# Patient Record
Sex: MALE | Race: BLACK OR AFRICAN AMERICAN | NOT HISPANIC OR LATINO
[De-identification: names, ages, dates, MRNs, and addresses within clinical notes are randomized per-mention and may not be internally consistent; named-entity substitution may affect disease eponyms.]

---

## 2017-01-06 ENCOUNTER — APPOINTMENT (OUTPATIENT)
Dept: HEART AND VASCULAR | Facility: CLINIC | Age: 57
End: 2017-01-06

## 2017-01-06 ENCOUNTER — APPOINTMENT (OUTPATIENT)
Dept: UROLOGY | Facility: CLINIC | Age: 57
End: 2017-01-06

## 2017-01-06 VITALS
HEIGHT: 72 IN | OXYGEN SATURATION: 95 % | SYSTOLIC BLOOD PRESSURE: 126 MMHG | HEART RATE: 95 BPM | BODY MASS INDEX: 30.48 KG/M2 | WEIGHT: 225 LBS | DIASTOLIC BLOOD PRESSURE: 80 MMHG | RESPIRATION RATE: 12 BRPM

## 2017-01-06 VITALS
TEMPERATURE: 98.4 F | OXYGEN SATURATION: 95 % | DIASTOLIC BLOOD PRESSURE: 77 MMHG | BODY MASS INDEX: 30.48 KG/M2 | HEART RATE: 93 BPM | SYSTOLIC BLOOD PRESSURE: 148 MMHG | HEIGHT: 72 IN | WEIGHT: 225 LBS

## 2017-01-06 DIAGNOSIS — Z86.79 PERSONAL HISTORY OF OTHER DISEASES OF THE CIRCULATORY SYSTEM: ICD-10-CM

## 2017-01-06 DIAGNOSIS — Z86.39 PERSONAL HISTORY OF OTHER ENDOCRINE, NUTRITIONAL AND METABOLIC DISEASE: ICD-10-CM

## 2017-01-06 RX ORDER — CLOTRIMAZOLE AND BETAMETHASONE DIPROPIONATE 10; .5 MG/G; MG/G
1-0.05 CREAM TOPICAL TWICE DAILY
Qty: 30 | Refills: 0 | Status: ACTIVE | COMMUNITY
Start: 2017-01-06 | End: 1900-01-01

## 2017-01-24 ENCOUNTER — OTHER (OUTPATIENT)
Age: 57
End: 2017-01-24

## 2017-01-24 ENCOUNTER — APPOINTMENT (OUTPATIENT)
Dept: ENDOCRINOLOGY | Facility: CLINIC | Age: 57
End: 2017-01-24

## 2017-01-24 VITALS
DIASTOLIC BLOOD PRESSURE: 84 MMHG | SYSTOLIC BLOOD PRESSURE: 114 MMHG | WEIGHT: 225 LBS | BODY MASS INDEX: 30.52 KG/M2 | HEART RATE: 69 BPM

## 2017-01-24 DIAGNOSIS — E11.65 TYPE 2 DIABETES MELLITUS WITH HYPERGLYCEMIA: ICD-10-CM

## 2017-01-24 RX ORDER — BLOOD SUGAR DIAGNOSTIC
STRIP MISCELLANEOUS TWICE DAILY
Qty: 60 | Refills: 3 | Status: ACTIVE | COMMUNITY
Start: 2017-01-24 | End: 1900-01-01

## 2017-01-24 RX ORDER — LANCETS 33 GAUGE
EACH MISCELLANEOUS
Qty: 50 | Refills: 3 | Status: ACTIVE | COMMUNITY
Start: 2017-01-24 | End: 1900-01-01

## 2017-02-07 ENCOUNTER — APPOINTMENT (OUTPATIENT)
Dept: UROLOGY | Facility: CLINIC | Age: 57
End: 2017-02-07

## 2017-02-07 VITALS
SYSTOLIC BLOOD PRESSURE: 128 MMHG | TEMPERATURE: 98.5 F | BODY MASS INDEX: 30.48 KG/M2 | HEIGHT: 72 IN | WEIGHT: 225 LBS | HEART RATE: 81 BPM | DIASTOLIC BLOOD PRESSURE: 76 MMHG

## 2017-02-21 ENCOUNTER — APPOINTMENT (OUTPATIENT)
Dept: UROLOGY | Facility: CLINIC | Age: 57
End: 2017-02-21

## 2017-02-28 ENCOUNTER — APPOINTMENT (OUTPATIENT)
Dept: UROLOGY | Facility: CLINIC | Age: 57
End: 2017-02-28

## 2017-03-02 ENCOUNTER — APPOINTMENT (OUTPATIENT)
Dept: ENDOCRINOLOGY | Facility: CLINIC | Age: 57
End: 2017-03-02

## 2017-03-06 ENCOUNTER — FORM ENCOUNTER (OUTPATIENT)
Age: 57
End: 2017-03-06

## 2017-03-07 ENCOUNTER — OUTPATIENT (OUTPATIENT)
Dept: OUTPATIENT SERVICES | Facility: HOSPITAL | Age: 57
LOS: 1 days | End: 2017-03-07
Payer: MEDICARE

## 2017-03-07 ENCOUNTER — APPOINTMENT (OUTPATIENT)
Dept: UROLOGY | Facility: CLINIC | Age: 57
End: 2017-03-07

## 2017-03-07 VITALS
TEMPERATURE: 99.1 F | HEART RATE: 86 BPM | BODY MASS INDEX: 30.48 KG/M2 | DIASTOLIC BLOOD PRESSURE: 67 MMHG | HEIGHT: 72 IN | SYSTOLIC BLOOD PRESSURE: 107 MMHG | WEIGHT: 225 LBS

## 2017-03-07 DIAGNOSIS — N52.9 MALE ERECTILE DYSFUNCTION, UNSPECIFIED: ICD-10-CM

## 2017-03-07 PROCEDURE — 71046 X-RAY EXAM CHEST 2 VIEWS: CPT

## 2017-03-07 PROCEDURE — 71020: CPT | Mod: 26

## 2017-03-07 RX ORDER — SULFAMETHOXAZOLE AND TRIMETHOPRIM 800; 160 MG/1; MG/1
800-160 TABLET ORAL TWICE DAILY
Qty: 18 | Refills: 0 | Status: ACTIVE | COMMUNITY
Start: 2017-03-07 | End: 1900-01-01

## 2017-03-07 RX ORDER — CHLORHEXIDINE GLUCONATE 213 G/1000ML
4 SOLUTION TOPICAL
Qty: 1000 | Refills: 0 | Status: ACTIVE | COMMUNITY
Start: 2017-03-07 | End: 1900-01-01

## 2017-03-08 ENCOUNTER — RX RENEWAL (OUTPATIENT)
Age: 57
End: 2017-03-08

## 2017-03-08 LAB
ANION GAP SERPL CALC-SCNC: 16 MMOL/L
APPEARANCE: CLEAR
APTT BLD: 29.8 SEC
BACTERIA: NEGATIVE
BASOPHILS # BLD AUTO: 0.04 K/UL
BASOPHILS NFR BLD AUTO: 0.5 %
BILIRUBIN URINE: NEGATIVE
BLOOD URINE: NEGATIVE
BUN SERPL-MCNC: 10 MG/DL
CALCIUM SERPL-MCNC: 9.7 MG/DL
CHLORIDE SERPL-SCNC: 100 MMOL/L
CO2 SERPL-SCNC: 23 MMOL/L
COLOR: YELLOW
CREAT SERPL-MCNC: 0.92 MG/DL
EOSINOPHIL # BLD AUTO: 0.09 K/UL
EOSINOPHIL NFR BLD AUTO: 1.2 %
GLUCOSE QUALITATIVE U: 1000 MG/DL
GLUCOSE SERPL-MCNC: 189 MG/DL
HBA1C MFR BLD HPLC: 7.9 %
HCT VFR BLD CALC: 44.1 %
HGB BLD-MCNC: 15 G/DL
IMM GRANULOCYTES NFR BLD AUTO: 0.3 %
INR PPP: 1.01 RATIO
KETONES URINE: NEGATIVE
LEUKOCYTE ESTERASE URINE: NEGATIVE
LYMPHOCYTES # BLD AUTO: 2.03 K/UL
LYMPHOCYTES NFR BLD AUTO: 27.1 %
MAN DIFF?: NORMAL
MCHC RBC-ENTMCNC: 30.2 PG
MCHC RBC-ENTMCNC: 34 GM/DL
MCV RBC AUTO: 88.7 FL
MICROSCOPIC-UA: NORMAL
MONOCYTES # BLD AUTO: 0.56 K/UL
MONOCYTES NFR BLD AUTO: 7.5 %
NEUTROPHILS # BLD AUTO: 4.74 K/UL
NEUTROPHILS NFR BLD AUTO: 63.4 %
NITRITE URINE: NEGATIVE
PH URINE: 5.5
PLATELET # BLD AUTO: 313 K/UL
POTASSIUM SERPL-SCNC: 4.5 MMOL/L
PROTEIN URINE: NEGATIVE MG/DL
PT BLD: 11.4 SEC
RBC # BLD: 4.97 M/UL
RBC # FLD: 12.7 %
RED BLOOD CELLS URINE: 4 /HPF
SODIUM SERPL-SCNC: 139 MMOL/L
SPECIFIC GRAVITY URINE: 1.04
SQUAMOUS EPITHELIAL CELLS: 0 /HPF
UROBILINOGEN URINE: NORMAL MG/DL
WBC # FLD AUTO: 7.48 K/UL
WHITE BLOOD CELLS URINE: 1 /HPF

## 2017-03-08 RX ORDER — TRAZODONE HYDROCHLORIDE 50 MG/1
50 TABLET ORAL
Qty: 30 | Refills: 5 | Status: ACTIVE | COMMUNITY
Start: 2017-03-08 | End: 1900-01-01

## 2017-03-09 ENCOUNTER — APPOINTMENT (OUTPATIENT)
Dept: ENDOCRINOLOGY | Facility: CLINIC | Age: 57
End: 2017-03-09

## 2017-03-09 LAB — BACTERIA UR CULT: NORMAL

## 2017-03-16 ENCOUNTER — APPOINTMENT (OUTPATIENT)
Dept: HEART AND VASCULAR | Facility: CLINIC | Age: 57
End: 2017-03-16

## 2017-03-21 RX ORDER — SILDENAFIL CITRATE 100 MG/1
100 TABLET, FILM COATED ORAL
Qty: 6 | Refills: 11 | Status: ACTIVE | COMMUNITY
Start: 2017-01-06 | End: 1900-01-01

## 2017-04-03 ENCOUNTER — APPOINTMENT (OUTPATIENT)
Dept: HEART AND VASCULAR | Facility: CLINIC | Age: 57
End: 2017-04-03

## 2017-04-10 ENCOUNTER — APPOINTMENT (OUTPATIENT)
Dept: ENDOCRINOLOGY | Facility: CLINIC | Age: 57
End: 2017-04-10

## 2017-04-24 ENCOUNTER — RX RENEWAL (OUTPATIENT)
Age: 57
End: 2017-04-24

## 2017-05-12 ENCOUNTER — RX RENEWAL (OUTPATIENT)
Age: 57
End: 2017-05-12

## 2017-05-18 ENCOUNTER — RX RENEWAL (OUTPATIENT)
Age: 57
End: 2017-05-18

## 2017-05-22 ENCOUNTER — RX RENEWAL (OUTPATIENT)
Age: 57
End: 2017-05-22

## 2017-08-11 ENCOUNTER — RX RENEWAL (OUTPATIENT)
Age: 57
End: 2017-08-11

## 2017-08-23 ENCOUNTER — RX RENEWAL (OUTPATIENT)
Age: 57
End: 2017-08-23

## 2017-09-06 ENCOUNTER — RX RENEWAL (OUTPATIENT)
Age: 57
End: 2017-09-06

## 2017-10-03 ENCOUNTER — APPOINTMENT (OUTPATIENT)
Dept: ENDOCRINOLOGY | Facility: CLINIC | Age: 57
End: 2017-10-03

## 2017-10-17 ENCOUNTER — RX RENEWAL (OUTPATIENT)
Age: 57
End: 2017-10-17

## 2017-10-25 ENCOUNTER — RX RENEWAL (OUTPATIENT)
Age: 57
End: 2017-10-25

## 2017-10-30 ENCOUNTER — RX RENEWAL (OUTPATIENT)
Age: 57
End: 2017-10-30

## 2017-10-30 RX ORDER — ALBUTEROL SULFATE 90 UG/1
108 (90 BASE) AEROSOL, METERED RESPIRATORY (INHALATION) EVERY 4 HOURS
Qty: 8.5 | Refills: 5 | Status: ACTIVE | COMMUNITY
Start: 2017-05-22 | End: 1900-01-01

## 2017-11-13 RX ORDER — TRIAMCINOLONE ACETONIDE 1 MG/G
0.1 CREAM TOPICAL TWICE DAILY
Qty: 30 | Refills: 0 | Status: ACTIVE | COMMUNITY
Start: 2017-02-07 | End: 1900-01-01

## 2017-11-27 ENCOUNTER — RX RENEWAL (OUTPATIENT)
Age: 57
End: 2017-11-27

## 2017-12-21 ENCOUNTER — RX RENEWAL (OUTPATIENT)
Age: 57
End: 2017-12-21

## 2017-12-21 RX ORDER — CANAGLIFLOZIN 100 MG/1
100 TABLET, FILM COATED ORAL
Qty: 30 | Refills: 0 | Status: ACTIVE | COMMUNITY
Start: 2017-01-24 | End: 1900-01-01

## 2018-01-02 ENCOUNTER — APPOINTMENT (OUTPATIENT)
Dept: HEART AND VASCULAR | Facility: CLINIC | Age: 58
End: 2018-01-02

## 2018-01-16 ENCOUNTER — APPOINTMENT (OUTPATIENT)
Dept: UROLOGY | Facility: CLINIC | Age: 58
End: 2018-01-16
Payer: MEDICARE

## 2018-01-16 VITALS
SYSTOLIC BLOOD PRESSURE: 137 MMHG | HEART RATE: 86 BPM | HEIGHT: 72 IN | BODY MASS INDEX: 29.8 KG/M2 | DIASTOLIC BLOOD PRESSURE: 83 MMHG | TEMPERATURE: 98.4 F | OXYGEN SATURATION: 98 % | WEIGHT: 220 LBS

## 2018-01-16 PROCEDURE — 99213 OFFICE O/P EST LOW 20 MIN: CPT

## 2018-01-16 RX ORDER — CLOTRIMAZOLE AND BETAMETHASONE DIPROPIONATE 10; .5 MG/G; MG/G
1-0.05 CREAM TOPICAL 3 TIMES DAILY
Qty: 30 | Refills: 1 | Status: ACTIVE | COMMUNITY
Start: 2018-01-16 | End: 1900-01-01

## 2018-01-22 ENCOUNTER — RX RENEWAL (OUTPATIENT)
Age: 58
End: 2018-01-22

## 2018-01-22 LAB — PSA SERPL-MCNC: 0.64 NG/ML

## 2018-02-20 ENCOUNTER — RX RENEWAL (OUTPATIENT)
Age: 58
End: 2018-02-20

## 2018-03-09 ENCOUNTER — RX RENEWAL (OUTPATIENT)
Age: 58
End: 2018-03-09

## 2018-04-27 ENCOUNTER — RX RENEWAL (OUTPATIENT)
Age: 58
End: 2018-04-27

## 2018-10-18 ENCOUNTER — RX RENEWAL (OUTPATIENT)
Age: 58
End: 2018-10-18

## 2018-12-27 ENCOUNTER — APPOINTMENT (OUTPATIENT)
Dept: UROLOGY | Facility: CLINIC | Age: 58
End: 2018-12-27

## 2019-01-03 ENCOUNTER — APPOINTMENT (OUTPATIENT)
Dept: UROLOGY | Facility: CLINIC | Age: 59
End: 2019-01-03
Payer: MEDICARE

## 2019-01-03 VITALS
TEMPERATURE: 98.9 F | SYSTOLIC BLOOD PRESSURE: 135 MMHG | OXYGEN SATURATION: 99 % | DIASTOLIC BLOOD PRESSURE: 88 MMHG | BODY MASS INDEX: 29.8 KG/M2 | WEIGHT: 220 LBS | HEIGHT: 72 IN | HEART RATE: 93 BPM

## 2019-01-03 DIAGNOSIS — N47.1 PHIMOSIS: ICD-10-CM

## 2019-01-03 PROCEDURE — 99213 OFFICE O/P EST LOW 20 MIN: CPT

## 2019-01-03 RX ORDER — CLOTRIMAZOLE AND BETAMETHASONE DIPROPIONATE 10; .5 MG/G; MG/G
1-0.05 CREAM TOPICAL TWICE DAILY
Qty: 1 | Refills: 0 | Status: ACTIVE | COMMUNITY
Start: 2019-01-03 | End: 1900-01-01

## 2019-01-09 LAB
APPEARANCE: CLEAR
BACTERIA UR CULT: NORMAL
BACTERIA: NEGATIVE
BILIRUBIN URINE: NEGATIVE
BLOOD URINE: NEGATIVE
COLOR: YELLOW
GLUCOSE QUALITATIVE U: 1000 MG/DL
HYALINE CASTS: 0 /LPF
KETONES URINE: NEGATIVE
LEUKOCYTE ESTERASE URINE: NEGATIVE
MICROSCOPIC-UA: NORMAL
NITRITE URINE: NEGATIVE
PH URINE: 5.5
PROTEIN URINE: NEGATIVE MG/DL
PSA SERPL-MCNC: 0.36 NG/ML
RED BLOOD CELLS URINE: 1 /HPF
SPECIFIC GRAVITY URINE: 1.04
SQUAMOUS EPITHELIAL CELLS: 0 /HPF
UROBILINOGEN URINE: NEGATIVE MG/DL
WHITE BLOOD CELLS URINE: 0 /HPF

## 2019-07-13 ENCOUNTER — HOSPITAL ENCOUNTER (INPATIENT)
Dept: HOSPITAL 74 - YASAS | Age: 59
LOS: 2 days | Discharge: LEFT BEFORE DISCHARGE | DRG: 894 | End: 2019-07-15
Attending: SURGERY | Admitting: SURGERY
Payer: COMMERCIAL

## 2019-07-13 DIAGNOSIS — F17.213: ICD-10-CM

## 2019-07-13 DIAGNOSIS — K21.9: ICD-10-CM

## 2019-07-13 DIAGNOSIS — E11.9: ICD-10-CM

## 2019-07-13 DIAGNOSIS — J45.40: ICD-10-CM

## 2019-07-13 DIAGNOSIS — Z79.84: ICD-10-CM

## 2019-07-13 DIAGNOSIS — F11.23: ICD-10-CM

## 2019-07-13 DIAGNOSIS — F32.9: ICD-10-CM

## 2019-07-13 DIAGNOSIS — F10.230: Primary | ICD-10-CM

## 2019-07-13 DIAGNOSIS — I10: ICD-10-CM

## 2019-07-13 PROCEDURE — HZ2ZZZZ DETOXIFICATION SERVICES FOR SUBSTANCE ABUSE TREATMENT: ICD-10-PCS | Performed by: SURGERY

## 2019-07-13 RX ADMIN — NICOTINE SCH: 7 PATCH TRANSDERMAL at 18:55

## 2019-07-13 RX ADMIN — METHOCARBAMOL PRN MG: 500 TABLET ORAL at 18:55

## 2019-07-13 RX ADMIN — Medication SCH MG: at 22:42

## 2019-07-13 RX ADMIN — Medication SCH TABLET: at 22:43

## 2019-07-13 NOTE — HP
COWS





- Scale


Resting Pulse: 0= [place] 80 or Below


Sweatin=Flushed/Facial Moisture


Restless Observation: 1= Difficult to Sit Still


Pupil Size: 1= Pupils >than Normal


Bone or Joint Aches: 2= Severe Diffuse Aches


Runny Nose/ Eye Tearin= Runny Nose/Eyes


GI Upset > 30mins: 2= Nausea/Diarrhea


Tremor Observation: 2= Slight Tremor Visible


Yawning Observation: 1= 1-2x During Session


Anxiety or Irritability: 2=Irritable/Anxious


Goose Flesh Skin: 3=Piloerection


COWS Score: 18





CIWA Score


Nausea/Vomitin


Muscle Tremors: 3


Anxiety: 3


Agitation: 2


Paroxysmal Sweats: 2


Orientation: 0-Oriented


Tacttile Disturbances: 2-Mild Itch/Numbness/Burn


Auditory Disturbances: 1-Very Mild


Visual Disturbances: 1-Very Mild Sensitivity


Headache: 2-Mild


CIWA-Ar Total Score: 18





- Admission Criteria


[facility] Guidelines: Admission for Medically Managed Detox: 


Requires at least one of the followin. CIWA greater than 12


2. Seizures within the past 24 hours


3. Delirium tremens within the past 24 hours


4. Hallucinations within the past 24 hours


5. Acute intervention needed for co  occurring medical disorder


6. Acute intervention needed for co  occurring psychiatric disorder


7. Severe withdrawal that cannot be handled at a lower level of care (continued


    vomiting, continued diarrhea, abnormal vital signs) requiring intravenous


    medication and/or fluids


8. Pregnancy





Patient presents the following: CIWA greater than 12


Admission Criteria Met: Admission criteria met





Admission ROS [facility]





- [facility]


Chief Complaint: 





I need detox, I'm sick


Allergies/Adverse Reactions: 


 Allergies











Allergy/AdvReac Type Severity Reaction Status Date / Time


 


No Known Allergies Allergy   Verified 19 15:05











History of Present Illness: 





58 year old man presents for detox from opiates and alcohol. Denies recent 

treatment, denies blackouts or seizures related to alcohol.





- Ebola screening


Have you traveled outside of the country in the last 21 days: No (N)


Have you had contact with anyone from an Ebola affected area: No


Have you been sick,other than usual withdrawal symptoms: No


Do you have a fever: No





- Review of Systems


Constitutional: Chills, Loss of Appetite, Changes in sleep


EENT: reports: Recent change in vision, Nose Congestion


Respiratory: reports: Cough, Shortness of Breath


Cardiac: reports: No Symptoms Reported


GI: reports: Poor Fluid Intake, Abdominal cramping


: reports: No Symptoms Reported


Musculoskeletal: reports: Back Pain, Joint Pain, Muscle Pain, Muscle Weakness


Integumentary: reports: Flushing


Neuro: reports: Headache, Numbness, Tremors


Endocrine: reports: No Symptoms Reported


Hematology: reports: No Symptoms Reported


Psychiatric: reports: Anxious, Depressed


Other Systems: Reviewed and Negative





Patient History





- Patient Medical History


Hx Anemia: No


Hx Asthma: Yes


Hx Chronic Obstructive Pulmonary Disease (COPD): No


Hx Cancer: No


Hx Cardiac Disorders: No


Hx Congestive Heart Failure: No


Hx Hypertension: No


Hx Hypercholesterolemia: No


Hx Pacemaker: No


HX Cerebrovascular Accident: No


Hx Seizures: No


Hx Dementia: No


Hx Diabetes: Yes


Hx Gastrointestinal Disorders: No


Hx Liver Disease: No


Hx Genitourinary Disorders: No


Hx Sexually Transmitted Disorders: No


Hx Renal Disease (ESRD): No


Hx Thyroid Disease: No


Hx Human Immunodeficiency Virus (HIV): No


Hx Hepatitis C: No


Hx Depression: Yes


Hx Suicide Attempt: No


Hx Bipolar Disorder: No


Hx Schizophrenia: No





- Patient Surgical History


Past Surgical History: No





- PPD History


Previous Implant?: Yes


Documented Results: Positive w/o proof


Implanted On Prior [facility] Admission?: No


PPD to be Administered?: No





- Smoking Cessation


Smoking history: Current every day smoker


Have you smoked in the past 12 months: Yes


Aproximately how many cigarettes per day: 10


Hx Chewing Tobacco Use: No


Initiated information on smoking cessation: Yes


'Breaking Loose' booklet given: 19





- Substance & Tx. History


Hx Alcohol Use: Yes


Hx Substance Use: No


Substance Use Type: Alcohol, Heroin, Tranquilizers


Hx Substance Use Treatment: No





- Substances abused


  ** Alcohol


Substance route: Oral


Frequency: Daily


Amount used: 1 40oz beer/day


Age of first use: 58


Date of last use: 07/10/19





  ** Heroin


Substance route: Inhalation


Frequency: Daily


Amount used: 3-4 bags/day


Age of first use: 48


Date of last use: 19





Family Disease History





- Family Disease History


Family History: Unremarkable





Admission Physical Exam [facility]





- Vital Signs


Vital Signs: 


 Vital Signs - 24 hr











  19





  15:02


 


Temperature 97.9 F


 


Pulse Rate 83


 


Respiratory 18





Rate 


 


Blood Pressure 148/95














- Physical


General Appearance: Yes: Mild Distress


HEENTM: Yes: EOMI, Hearing grossly Normal, Normal ENT Inspection, Normocephalic

, Normal Voice, JOSUE, Pharynx Normal


Respiratory: Yes: Chest Non-Tender, No Respiratory Distress, No Accessory 

Muscle Use, Wheezing


Neck: Yes: No masses,lesions,Nodules, Supple


Breast: Yes: Breast Exam Deferred


Cardiology: Yes: Regular Rhythm, Regular Rate


Abdominal: Yes: Normal Bowel Sounds, Soft


Genitourinary: Yes: Within Normal Limits


Back: Yes: Normal Inspection


Musculoskeletal: Yes: full range of Motion, Back pain, Muscle Pain


Extremities: Yes: Tremors, Coldness


Neurological: Yes: CNs II-XII NML intact, Fully Oriented, Alert, Normal Mood/

Affect, Normal Response


Integumentary: Yes: Cold, Clammy


Lymphatic: Yes: Within Normal Limits





- Diagnostic


(1) Alcohol dependence with uncomplicated withdrawal


Current Visit: Yes   Status: Acute   





(2) Opiate addiction


Current Visit: Yes   Status: Acute   


Qualifiers: 


   Substance use status: uncomplicated   Qualified Code(s): F11.20 - Opioid 

dependence, uncomplicated   





(3) Nicotine addiction


Current Visit: Yes   Status: Acute   


Qualifiers: 


   Nicotine product type: cigarettes   Substance use status: uncomplicated   

Qualified Code(s): F17.210 - Nicotine dependence, cigarettes, uncomplicated   





(4) Asthma


Current Visit: Yes   Status: Acute   


Qualifiers: 


   Asthma severity: moderate   Asthma persistence: persistent 





Cleared for Admission S





- Detox or Rehab


[facility] Level of Care: Medically Managed


Detox Regimen/Protocol: Methadone/Librium


Claeared for Rehab Admission: No





Breathalyzer





- Breathalyzer


Breathalyzer: 0





Urine Drug Screen





- Test Device


Lot number: [id number]


Expiration date: 21





- Control


Is test valid?: Yes





- Results


Drug screen NEGATIVE: No


Urine drug screen results: THC-Marijuana, FEN-Fentanyl, MOP-Opiates, OXY-

Oxycodone





Inpatient Rehab Admission





- Rehab Decision to Admit


Inpatient rehab admission?: No

## 2019-07-14 LAB
ALBUMIN SERPL-MCNC: 3.1 G/DL (ref 3.4–5)
ALP SERPL-CCNC: 69 U/L (ref 45–117)
ALT SERPL-CCNC: 29 U/L (ref 13–61)
ANION GAP SERPL CALC-SCNC: 6 MMOL/L (ref 8–16)
AST SERPL-CCNC: 8 U/L (ref 15–37)
BILIRUB SERPL-MCNC: 0.5 MG/DL (ref 0.2–1)
BUN SERPL-MCNC: 8.9 MG/DL (ref 7–18)
CALCIUM SERPL-MCNC: 8.5 MG/DL (ref 8.5–10.1)
CHLORIDE SERPL-SCNC: 107 MMOL/L (ref 98–107)
CO2 SERPL-SCNC: 28 MMOL/L (ref 21–32)
CREAT SERPL-MCNC: 0.8 MG/DL (ref 0.55–1.3)
DEPRECATED RDW RBC AUTO: 12.9 % (ref 11.9–15.9)
GLUCOSE SERPL-MCNC: 192 MG/DL (ref 74–106)
HCT VFR BLD CALC: 38.1 % (ref 35.4–49)
HGB BLD-MCNC: 13.1 GM/DL (ref 11.7–16.9)
MCH RBC QN AUTO: 29.5 PG (ref 25.7–33.7)
MCHC RBC AUTO-ENTMCNC: 34.5 G/DL (ref 32–35.9)
MCV RBC: 85.7 FL (ref 80–96)
PLATELET # BLD AUTO: 261 K/MM3 (ref 134–434)
PMV BLD: 8.5 FL (ref 7.5–11.1)
POTASSIUM SERPLBLD-SCNC: 3.6 MMOL/L (ref 3.5–5.1)
PROT SERPL-MCNC: 5.9 G/DL (ref 6.4–8.2)
RBC # BLD AUTO: 4.44 M/MM3 (ref 4–5.6)
SODIUM SERPL-SCNC: 141 MMOL/L (ref 136–145)
WBC # BLD AUTO: 7.2 K/MM3 (ref 4–10)

## 2019-07-14 RX ADMIN — NICOTINE SCH: 7 PATCH TRANSDERMAL at 10:25

## 2019-07-14 RX ADMIN — METHOCARBAMOL PRN MG: 500 TABLET ORAL at 22:26

## 2019-07-14 RX ADMIN — Medication SCH: at 22:29

## 2019-07-14 RX ADMIN — Medication SCH MG: at 22:24

## 2019-07-14 RX ADMIN — Medication SCH TABLET: at 07:34

## 2019-07-14 RX ADMIN — LOSARTAN POTASSIUM SCH MG: 25 TABLET, FILM COATED ORAL at 23:28

## 2019-07-14 RX ADMIN — Medication SCH TAB: at 10:25

## 2019-07-14 NOTE — PN
S CIWA





- CIWA Score


Nausea/Vomitin-Mild Nausea/No Vomiting


Muscle Tremors: 3


Anxiety: 3


Agitation: 3


Paroxysmal Sweats: 1-Minimal Palms Moist


Orientation: 1-Uncertain about Date


Tacttile Disturbances: 1-Very Mild Itch/Numbness


Auditory Disturbances: 0-None


Visual Disturbances: 0-None


Headache: 0-None Present


CIWA-Ar Total Score: 13





[facility] COWS





- Scale


Resting Pulse: 0= [place] 80 or Below


Sweatin= Chills/Flushing


Restless Observation: 0= Sits Still


Pupil Size: 0= Normal to Room Light


Bone or Joint Aches: 2= Severe Diffuse Aches


Runny Nose/ Eye Tearin= Nasal Congestion


GI Upset > 30mins: 2= Nausea/Diarrhea


Tremor Observation of Outstretched Hands: 2= Slight Tremor Visible


Yawning Observation: 2= >3x During Session


Anxiety or Irritability: 2=Irritable/Anxious


Goose Flesh Skin: 0=Smooth Skin


COWS Score: 12





[facility] Progress Note (SOAP)


Subjective: 





58 years old male admitted on 19 for alcohol and opiate withdrawal sx


medical history of asthma diabete II hypertension 


patient received monthly endocet of 150 pill x 30 days last filled 19





external mediation reviewed that the patient is taking losartan 25 mg po daily 

for hypertension


begin losartan 25 mg po daily and clondine 0.1 mg po q6h prn for hypertension


Objective: 





19 12:09


 Vital Signs











Temperature  97.8 F   19 09:15


 


Pulse Rate  62   19 09:15


 


Respiratory Rate  18   19 09:15


 


Blood Pressure  147/82   19 09:15


 


O2 Sat by Pulse Oximetry (%)      








 Laboratory Last Values











WBC  7.2 K/mm3 (4.0-10.0)   19  07:50    


 


RBC  4.44 M/mm3 (4.00-5.60)   19  07:50    


 


Hgb  13.1 GM/dL (11.7-16.9)   19  07:50    


 


Hct  38.1 % (35.4-49)   19  07:50    


 


MCV  85.7 fl (80-96)   19  07:50    


 


MCH  29.5 pg (25.7-33.7)   19  07:50    


 


MCHC  34.5 g/dl (32.0-35.9)   19  07:50    


 


RDW  12.9 % (11.9-15.9)   19  07:50    


 


Plt Count  261 K/MM3 (134-434)   19  07:50    


 


MPV  8.5 fl (7.5-11.1)   19  07:50    


 


Sodium  141 mmol/L (136-145)   19  07:50    


 


Potassium  3.6 mmol/L (3.5-5.1)   19  07:50    


 


Chloride  107 mmol/L ()   19  07:50    


 


Carbon Dioxide  28 mmol/L (21-32)   19  07:50    


 


Anion Gap  6 MMOL/L (8-16)  L  19  07:50    


 


BUN  8.9 mg/dL (7-18)   19  07:50    


 


Creatinine  0.8 mg/dL (0.55-1.3)   19  07:50    


 


Est GFR (CKD-EPI)AfAm  114.93   19  07:50    


 


Est GFR (CKD-EPI)NonAf  99.16   19  07:50    


 


Random Glucose  192 mg/dL ()  H  19  07:50    


 


Calcium  8.5 mg/dL (8.5-10.1)   19  07:50    


 


Total Bilirubin  0.5 mg/dL (0.2-1)   19  07:50    


 


AST  8 U/L (15-37)  L  19  07:50    


 


ALT  29 U/L (13-61)   19  07:50    


 


Alkaline Phosphatase  69 U/L ()   19  07:50    


 


Total Protein  5.9 g/dl (6.4-8.2)  L  19  07:50    


 


Albumin  3.1 g/dl (3.4-5.0)  L  19  07:50    


 


RPR Titer  Nonreactive  (NONREACTIVE)   19  07:50    








lab noted


Assessment: 





19 12:10


alcohol and opiate withdrawal sx


Plan: 





continue alcohol and opiate detox

## 2019-07-15 VITALS — SYSTOLIC BLOOD PRESSURE: 124 MMHG | DIASTOLIC BLOOD PRESSURE: 73 MMHG | TEMPERATURE: 96.7 F | HEART RATE: 75 BPM

## 2019-07-15 RX ADMIN — NICOTINE SCH: 7 PATCH TRANSDERMAL at 10:11

## 2019-07-15 RX ADMIN — LOSARTAN POTASSIUM SCH MG: 25 TABLET, FILM COATED ORAL at 10:10

## 2019-07-15 RX ADMIN — Medication SCH TAB: at 10:11

## 2019-07-15 RX ADMIN — Medication SCH TABLET: at 08:04

## 2019-07-15 NOTE — DS
[facility] Detox Discharge Summary


Admission Date: 


07/13/19





Discharge Date: 07/15/19





- History


Present History: Alcohol Dependence, Opioid Dependence


Additional Comments: 





58 years old male admitted on 07/13/19 for alcohol and opiate withdrawal sx


medical history of gerd and asthma treated with zantac bid 150 mg





alert no acute distress denies suicidal ideation 


patient insists to leave the detox unit without apparent reason 








- Physical Exam Results


Vital Signs: 


 Vital Signs











Temperature  96.7 F L  07/15/19 09:15


 


Pulse Rate  75   07/15/19 09:15


 


Respiratory Rate  20   07/15/19 09:15


 


Blood Pressure  124/73   07/15/19 09:15


 


O2 Sat by Pulse Oximetry (%)      











Pertinent Admission Physical Exam Findings: 





alcohol and opiate withdrawal sx


 Laboratory Last Values











WBC  7.2 K/mm3 (4.0-10.0)   07/14/19  07:50    


 


RBC  4.44 M/mm3 (4.00-5.60)   07/14/19  07:50    


 


Hgb  13.1 GM/dL (11.7-16.9)   07/14/19  07:50    


 


Hct  38.1 % (35.4-49)   07/14/19  07:50    


 


MCV  85.7 fl (80-96)   07/14/19  07:50    


 


MCH  29.5 pg (25.7-33.7)   07/14/19  07:50    


 


MCHC  34.5 g/dl (32.0-35.9)   07/14/19  07:50    


 


RDW  12.9 % (11.9-15.9)   07/14/19  07:50    


 


Plt Count  261 K/MM3 (134-434)   07/14/19  07:50    


 


MPV  8.5 fl (7.5-11.1)   07/14/19  07:50    


 


Sodium  141 mmol/L (136-145)   07/14/19  07:50    


 


Potassium  3.6 mmol/L (3.5-5.1)   07/14/19  07:50    


 


Chloride  107 mmol/L ()   07/14/19  07:50    


 


Carbon Dioxide  28 mmol/L (21-32)   07/14/19  07:50    


 


Anion Gap  6 MMOL/L (8-16)  L  07/14/19  07:50    


 


BUN  8.9 mg/dL (7-18)   07/14/19  07:50    


 


Creatinine  0.8 mg/dL (0.55-1.3)   07/14/19  07:50    


 


Est GFR (CKD-EPI)AfAm  114.93   07/14/19  07:50    


 


Est GFR (CKD-EPI)NonAf  99.16   07/14/19  07:50    


 


POC Glucometer  168 UNITS ()   07/15/19  05:33    


 


Random Glucose  192 mg/dL ()  H  07/14/19  07:50    


 


Calcium  8.5 mg/dL (8.5-10.1)   07/14/19  07:50    


 


Total Bilirubin  0.5 mg/dL (0.2-1)   07/14/19  07:50    


 


AST  8 U/L (15-37)  L  07/14/19  07:50    


 


ALT  29 U/L (13-61)   07/14/19  07:50    


 


Alkaline Phosphatase  69 U/L ()   07/14/19  07:50    


 


Total Protein  5.9 g/dl (6.4-8.2)  L  07/14/19  07:50    


 


Albumin  3.1 g/dl (3.4-5.0)  L  07/14/19  07:50    


 


RPR Titer  Nonreactive  (NONREACTIVE)   07/14/19  07:50    








lab noted





- Treatment


Hospital Course: Detox Protocol Followed, Responded well


Patient has Accepted a Rehab Referral to: community support approach





- Medication


Discharge Medications: 


Ambulatory Orders





Janumet 50-1,000 mg Tablet 1 tablet PO BID 07/13/19 


Multivitamin 1 tab DAILY 07/13/19 











- Diagnosis


(1) Alcohol dependence with uncomplicated withdrawal


Status: Acute   





(2) Asthma


Status: Chronic   


Qualifiers: 


   Asthma severity: moderate   Asthma persistence: persistent   Asthma 

complication type: uncomplicated   Qualified Code(s): J45.40 - Moderate 

persistent asthma, uncomplicated   





(3) Nicotine addiction


Status: Acute   


Qualifiers: 


   Nicotine product type: cigarettes   Substance use status: in withdrawal   

Qualified Code(s): F17.213 - Nicotine dependence, cigarettes, with withdrawal   





(4) Opiate addiction


Status: Acute   


Qualifiers: 


   Substance use status: uncomplicated   Qualified Code(s): F11.20 - Opioid 

dependence, uncomplicated   





- AMA


Did Patient Leave Against Medical Advice: Yes

## 2019-07-15 NOTE — PN
S CIWA





- CIWA Score


Nausea/Vomitin-Mild Nausea/No Vomiting


Muscle Tremors: 2


Anxiety: 3


Agitation: 4-Moderately Restless


Paroxysmal Sweats: 1-Minimal Palms Moist


Orientation: 0-Oriented


Tacttile Disturbances: 1-Very Mild Itch/Numbness


Auditory Disturbances: 0-None


Visual Disturbances: 0-None


Headache: 0-None Present


CIWA-Ar Total Score: 12





[facility] COWS





- Scale


Resting Pulse: 0= [place] 80 or Below


Sweatin= Chills/Flushing


Restless Observation: 0= Sits Still


Pupil Size: 0= Normal to Room Light


Bone or Joint Aches: 1= Mild Discomfort


Runny Nose/ Eye Tearin= Nasal Congestion


GI Upset > 30mins: 2= Nausea/Diarrhea


Tremor Observation of Outstretched Hands: 2= Slight Tremor Visible


Yawning Observation: 2= >3x During Session


Anxiety or Irritability: 2=Irritable/Anxious


Goose Flesh Skin: 0=Smooth Skin


COWS Score: 11





[facility] Progress Note (SOAP)


Subjective: 





report GI distress due to withdrawal that yesterday he did not drank up all the 

liquid of methadone 


today he has GI distress 


recommend support therapeutic measures "not do any for me"





discuss medication assisted treatment program


Objective: 





07/15/19 10:07


 Vital Signs











Temperature  96.7 F L  07/15/19 09:15


 


Pulse Rate  75   07/15/19 09:15


 


Respiratory Rate  20   07/15/19 09:15


 


Blood Pressure  124/73   07/15/19 09:15


 


O2 Sat by Pulse Oximetry (%)      








 Laboratory Last Values











WBC  7.2 K/mm3 (4.0-10.0)   19  07:50    


 


RBC  4.44 M/mm3 (4.00-5.60)   19  07:50    


 


Hgb  13.1 GM/dL (11.7-16.9)   19  07:50    


 


Hct  38.1 % (35.4-49)   19  07:50    


 


MCV  85.7 fl (80-96)   19  07:50    


 


MCH  29.5 pg (25.7-33.7)   19  07:50    


 


MCHC  34.5 g/dl (32.0-35.9)   19  07:50    


 


RDW  12.9 % (11.9-15.9)   19  07:50    


 


Plt Count  261 K/MM3 (134-434)   19  07:50    


 


MPV  8.5 fl (7.5-11.1)   19  07:50    


 


Sodium  141 mmol/L (136-145)   19  07:50    


 


Potassium  3.6 mmol/L (3.5-5.1)   19  07:50    


 


Chloride  107 mmol/L ()   19  07:50    


 


Carbon Dioxide  28 mmol/L (21-32)   19  07:50    


 


Anion Gap  6 MMOL/L (8-16)  L  19  07:50    


 


BUN  8.9 mg/dL (7-18)   19  07:50    


 


Creatinine  0.8 mg/dL (0.55-1.3)   19  07:50    


 


Est GFR (CKD-EPI)AfAm  114.93   19  07:50    


 


Est GFR (CKD-EPI)NonAf  99.16   19  07:50    


 


POC Glucometer  168 UNITS ()   07/15/19  05:33    


 


Random Glucose  192 mg/dL ()  H  19  07:50    


 


Calcium  8.5 mg/dL (8.5-10.1)   19  07:50    


 


Total Bilirubin  0.5 mg/dL (0.2-1)   19  07:50    


 


AST  8 U/L (15-37)  L  19  07:50    


 


ALT  29 U/L (13-61)   19  07:50    


 


Alkaline Phosphatase  69 U/L ()   19  07:50    


 


Total Protein  5.9 g/dl (6.4-8.2)  L  19  07:50    


 


Albumin  3.1 g/dl (3.4-5.0)  L  19  07:50    


 


RPR Titer  Nonreactive  (NONREACTIVE)   19  07:50    








lab noted


Assessment: 





07/15/19 10:07


alcohol and opiate withdrawal sx


Plan: 





continue alcohol and opiate detox


discontinue motrin begin zantac